# Patient Record
Sex: MALE | Race: WHITE | Employment: OTHER | URBAN - METROPOLITAN AREA
[De-identification: names, ages, dates, MRNs, and addresses within clinical notes are randomized per-mention and may not be internally consistent; named-entity substitution may affect disease eponyms.]

---

## 2017-06-07 ENCOUNTER — HOSPITAL ENCOUNTER (OUTPATIENT)
Dept: GENERAL RADIOLOGY | Age: 82
Discharge: HOME | End: 2017-06-07
Payer: MEDICARE

## 2017-06-07 DIAGNOSIS — J38.00 VOCAL CORD PARESIS: ICD-10-CM

## 2017-06-07 PROCEDURE — 71020 XR CHEST PA LAT: CPT

## 2018-06-19 ENCOUNTER — OFFICE VISIT (OUTPATIENT)
Dept: ENDOCRINOLOGY | Age: 83
End: 2018-06-19

## 2018-06-19 VITALS
DIASTOLIC BLOOD PRESSURE: 69 MMHG | BODY MASS INDEX: 21.77 KG/M2 | SYSTOLIC BLOOD PRESSURE: 163 MMHG | HEART RATE: 70 BPM | WEIGHT: 155.5 LBS | HEIGHT: 71 IN

## 2018-06-19 DIAGNOSIS — E03.9 HYPOTHYROIDISM, UNSPECIFIED TYPE: Primary | ICD-10-CM

## 2018-06-19 RX ORDER — ATORVASTATIN CALCIUM 40 MG/1
TABLET, FILM COATED ORAL
Refills: 0 | COMMUNITY
Start: 2018-05-21

## 2018-06-19 RX ORDER — AMLODIPINE BESYLATE 2.5 MG/1
TABLET ORAL
Refills: 0 | COMMUNITY
Start: 2018-04-12

## 2018-06-19 RX ORDER — AMMONIUM LACTATE 12 G/100G
LOTION TOPICAL
Refills: 0 | COMMUNITY
Start: 2018-06-12

## 2018-06-19 RX ORDER — POTASSIUM CHLORIDE 750 MG/1
TABLET, FILM COATED, EXTENDED RELEASE ORAL
Refills: 0 | COMMUNITY
Start: 2018-06-15

## 2018-06-19 RX ORDER — HYDROXYZINE 25 MG/1
TABLET, FILM COATED ORAL
Refills: 0 | COMMUNITY
Start: 2018-06-12

## 2018-06-19 RX ORDER — POLYETHYLENE GLYCOL 3350 17 G/17G
17 POWDER, FOR SOLUTION ORAL DAILY
COMMUNITY

## 2018-06-19 RX ORDER — NITROGLYCERIN 0.4 MG/1
0.4 TABLET SUBLINGUAL
COMMUNITY

## 2018-06-19 RX ORDER — AMIODARONE HYDROCHLORIDE 200 MG/1
TABLET ORAL
Refills: 0 | COMMUNITY
Start: 2018-05-14

## 2018-06-19 RX ORDER — LEVOTHYROXINE SODIUM 75 UG/1
TABLET ORAL
Refills: 0 | COMMUNITY
Start: 2018-06-13

## 2018-06-19 RX ORDER — CAMPHOR AND MENTHOL 5; 5 MG/ML; MG/ML
LOTION TOPICAL
Refills: 0 | COMMUNITY
Start: 2018-06-12

## 2018-06-19 RX ORDER — LEVOTHYROXINE SODIUM 50 UG/1
TABLET ORAL
Refills: 0 | COMMUNITY
Start: 2018-03-22

## 2018-06-19 NOTE — MR AVS SNAPSHOT
[address] [place] [address] [address] [facility] [address] [address] [phone] Patient: Ester Jean MRN: [id number] :1934 Visit Information Date & Time Provider Department Dept. Phone Encounter #  
 2018 11:10 AM Delma Gusman, [address] [place] [address] Diabetes and Endocrinology [phone] [id number] Your Appointments 10/19/2018 10:30 AM  
Follow Up with MD David Mallory Diabetes [facility]in[facility]: 4 month f/u   Thyroid [address] [facility] [address] [id number] [address] Upcoming Health Maintenance Date Due DTaP/Tdap/Td series (1 - Tdap) 10/25/1955 ZOSTER VACCINE AGE 60> 1994 GLAUCOMA SCREENING Q2Y 10/25/1999 Pneumococcal 65+ Low/Medium Risk (1 of 2 - PCV13) 10/25/1999 MEDICARE YEARLY EXAM 3/20/2018 Influenza Age 5 to Adult 2018 Allergies as of 2018  Review Complete On: 2018 By: Delma Gusman MD  
 No Known Allergies Current Immunizations  Never Reviewed No immunizations on file. Not reviewed this visit You Were Diagnosed With   
  
 Codes Comments Hypothyroidism, unspecified type    -  Primary ICD-10-CM: E03.9 ICD-9-CM: 366. 9 Vitals BP Pulse Height(growth percentile) Weight(growth percentile) BMI Smoking Status 163/69 (BP 1 Location: Right arm, BP Patient Position: Sitting) 70 5' 11\" (1.803 m) 155 lb 8 oz (70.5 kg) 21.69 kg/m2 Former Smoker Vitals History BMI and BSA Data Body Mass Index Body Surface Area  
 21.69 kg/m 2 1.88 m 2 Preferred Pharmacy Pharmacy Name Phone [facility] [phone] [place] [address] [phone] Your Updated Medication List  
  
   
This list is accurate as of 18 12:10 PM.  Always use your most recent med list.  
  
 amiodarone 200 mg tablet Commonly known as:  CORDARONE  
take 1 tablet by mouth once daily  
  
 amLODIPine 2.5 mg tablet Commonly known as:  [facility] Take two tabs daily  
  
 ammonium lactate 12 % lotion Commonly known as:  LAC-HYDRIN  
APPLY TOPICALLY AS NEEDED FOR DRY SKIN  
  
 atorvastatin 40 mg tablet Commonly known as:  LIPITOR  
  
 hydrOXYzine HCl 25 mg tablet Commonly known as:  ATARAX  
take 1 tablet by mouth at bedtime if needed for itching * levothyroxine 50 mcg tablet Commonly known as:  SYNTHROID * levothyroxine 75 mcg tablet Commonly known as:  SYNTHROID MIRALAX 17 gram/dose powder Generic drug:  polyethylene glycol Take 17 g by mouth daily. nitroglycerin 0.4 mg SL tablet Commonly known as:  NITROSTAT  
0.4 mg by SubLINGual route every five (5) minutes as needed for Chest Pain. Up to 3 doses. potassium chloride SR 10 mEq tablet Commonly known as:  KLOR-CON 10  
  
 SARNA ANTI-ITCH 0.5-0.5 % lotion Generic drug:  camphor-menthol APPLY TOPICALLY AS NEEDED FOR ITCHING  
  
 * Notice: This list has 2 medication(s) that are the same as other medications prescribed for you. Read the directions carefully, and ask your doctor or other care provider to review them with you. Patient Instructions PLEASE READ THESE INSTRUCTIONS:  
 
1. Plan to continue 50 mcg of levothyroxine each morning. 2. Remember to take levothyroxine with a glass of water on an empty stomach each day in the mornings, 1 hour prior to ingesting any food or other medications, including vitamins. 3. If you only need a slight dose reduction in the future, consider taking this dose only 6 days per week rather than 7 days per week. Make sure to follow up with Dr. Marlene Yuan, Call with Marnie oconnor. [address] [facility] Diabetes & Endocrinology 8 [facility]! Jerardo Sorto introduces Tagent patient portal. Now you can access parts of your medical record, email your doctor's office, and request medication refills online. 1. In your internet browser, go to [email or web address] 2. Click on the First Time User? Click Here link in the Sign In box. You will see the New Member Sign Up page. 3. Enter your Tagent Access Code exactly as it appears below. You will not need to use this code after youve completed the sign-up process. If you do not sign up before the expiration date, you must request a new code. · Tagent Access Code: [email or web address] Expires: 9/17/2018 12:10 PM 
 
4. Enter the last four digits of your Social Security Number (xxxx) and Date of Birth (mm/dd/yyyy) as indicated and click Submit. You will be taken to the next sign-up page. 5. Create a Tagent ID. This will be your Tagent login ID and cannot be changed, so think of one that is secure and easy to remember. 6. Create a Tagent password. You can change your password at any time. 7. Enter your Password Reset Question and Answer. This can be used at a later time if you forget your password. 8. Enter your e-mail address. You will receive e-mail notification when new information is available in [address] 9. Click Sign Up. You can now view and download portions of your medical record. 10. Click the Download Summary menu link to download a portable copy of your medical information. If you have questions, please visit the Frequently Asked Questions section of the Tagent website. Remember, Tagent is NOT to be used for urgent needs. For medical emergencies, dial 911. Now available from your iPhone and Android! Please provide this summary of care documentation to your next provider. Your primary care clinician is listed as Sergey Awad. If you have any questions after today's visit, please call [phone].

## 2018-06-19 NOTE — PROGRESS NOTES
CONSULTATION REQUESTED BY: Isabel Skinner MD     REASON FOR CONSULT: Hypothyroidism    CHIEF COMPLAINT: Evaluation for hypothyroidism    HISTORY OF PRESENT ILLNESS:   Almaz Bella is a 80 y.o. male with a PMHx as noted below who was referred to our endocrinology clinic for evaluation of hypothyroidism. Thyroid History:  Diagnosed with atrial fibrillation in Aug 2016, been on amiodarone since then. Patient was very weak, stumbling, and his thyroid level was checked in January. He was started on 50 mcg of levothyroxine in January of this year 2018. Family history is not significant for thryoid disease. Currently taking levothyroxine 50 mcg once daily  The patient admits to taking it 1 hour before breakfast on an empty stomach. Patient denies palpitations, had lost some weight. Review of outside labs:   7/27/18: TSH 2.59  1/22/18: .6  2/15/18: TSH 41.6  3/12/18: TSH 23.2  4/10/18: TSH 17.62  6/12/18: TSH 2.22, FT4 1.32    PAST MEDICAL/SURGICAL HISTORY:   Past Medical History:   Diagnosis Date    Angina pectoris (HCC)     Atrial fibrillation (HCC)     B12 deficiency     Bowel obstruction (HCC)     Brain bleed (HCC)     [facility] cancer ([facility] [address]     Hyperlipidemia     Hypertension     Kidney disease     Mitral valve prolapse      No past surgical history on file. ALLERGIES:   No Known Allergies    MEDICATIONS ON ADMISSION:     Current Outpatient Prescriptions:     amiodarone (CORDARONE) 200 mg tablet, take 1 tablet by mouth once daily, Disp: , Rfl: 0    amLODIPine (NORVASC) 2.5 mg tablet, Take two tabs daily, Disp: , Rfl: 0    potassium chloride SR (KLOR-CON 10) 10 mEq tablet, , Disp: , Rfl: 0    atorvastatin (LIPITOR) 40 mg tablet, , Disp: , Rfl: 0    levothyroxine (SYNTHROID) 75 mcg tablet, , Disp: , Rfl: 0    polyethylene glycol (MIRALAX) 17 gram/dose powder, Take 17 g by mouth daily. , Disp: , Rfl:     levothyroxine (SYNTHROID) 50 mcg tablet, , Disp: , Rfl: 0   SARNA ANTI-ITCH 0.5-0.5 % lotion, APPLY TOPICALLY AS NEEDED FOR ITCHING, Disp: , Rfl: 0    hydrOXYzine HCl (ATARAX) 25 mg tablet, take 1 tablet by mouth at bedtime if needed for itching, Disp: , Rfl: 0    ammonium lactate (LAC-HYDRIN) 12 % lotion, APPLY TOPICALLY AS NEEDED FOR DRY SKIN, Disp: , Rfl: 0    nitroglycerin (NITROSTAT) 0.4 mg SL tablet, 0.4 mg by SubLINGual route every five (5) minutes as needed for Chest Pain. Up to 3 doses. , Disp: , Rfl:     SOCIAL HISTORY:   Social History     Social History    Marital status:      Spouse name: N/A    Number of children: N/A    Years of education: N/A     Occupational History    Not on file. Social History Main Topics    Smoking status: Former Smoker    Smokeless tobacco: Never Used    Alcohol use No    Drug use: Not on file    Sexual activity: Not on file     Other Topics Concern    Not on file     Social History Narrative       FAMILY HISTORY:  Family History   Problem Relation Age of Onset    Alzheimer Mother     Coronary Artery Disease Father     Hypertension Brother     Heart Disease Brother        REVIEW OF SYSTEMS: Complete ROS assessed and noted for that which is described above, all else are negative.   Eyes: normal  ENT: normal  CVS: normal  Resp: normal  GI: normal  : normal  GYN: normal  Endocrine: normal  Integument: normal  Musculoskeletal: normal  Neuro: normal  Psych: normal      PHYSICAL EXAMINATION:    VITAL SIGNS:  Visit Vitals    /69 (BP 1 Location: Right arm, BP Patient Position: Sitting)    Pulse 70    Ht 5' 11\" (1.803 m)    Wt 155 lb 8 oz (70.5 kg)    BMI 21.69 kg/m2       GENERAL: NCAT, Sitting comfortably, NAD  EYES: EOMI, non-icteric, no proptosis  Ear/Nose/Throat: NCAT, no inflammation, no masses, thyroid gland is not appreciably enlarged  LYMPH NODES: No LAD  CARDIOVASCULAR: S1 S2, RRR, No murmur, 2+ radial pulses  RESPIRATORY: CTA b/l, no wheeze/rales  GASTROINTESTINAL: ND  MUSCULOSKELETAL: Normal ROM, no atrophy  SKIN: warm, no edema/rash/ or other skin changes  NEUROLOGIC: 5/5 power all extremities, no tremor, AAOx3  PSYCHIATRIC: Normal affect, Normal insight and judgement       REVIEW OF LABORATORY AND RADIOLOGY DATA:   Labs and documentation have been reviewed as described above. ASSESSMENT AND PLAN:   Romero Chacon is a 80 y.o. male with a PMHx as noted above who was referred to our endocrinology clinic for evaluation of hypothyroidism. Hypothyroidism    Today, we spent time discussing the physiologic mechanisms which govern thyroid hormone regulation and the normal responses to abnormal thyroid function. We also discussed their current condition in the setting of this physiologic response. 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 We discussed this thoroughly and noted the likelihood that he would need thyroid hormone replacement in the long term. He is taking it properly and feeling much better. Discussed the symptoms of hyperthyroidism and its effect on the heart considering his atrial fibrillation, and the risk of recurrent falls if his thyroid levels get to low or too high. PLAN:  Recent levels normal as discussed, continue 50 mcg once daily. Due to atrial fibrillation, goal TSH no less than 1.0 (1 - 5 ok). If only a slight dose reduction is needed in the future, can reduce same dose of 50 mcg to 6 days per week, rather than 7 days per week. Patient advised to take levothyroxine with a glass of water on an empty stomach each day in the mornings, 1 hour prior to ingesting any food or other medications, including vitamins. Patient and his wife were given follow up options, either to return to our clinic or to resume thyroid follow up with Dr. Ana Paula Cooper, and they have noted they would like to continue routine thyroid follow up with Dr. Ana Paula Cooper. They are welcome to call with concerns. Milan Jaciel.  [address] [facility] End[facility]

## 2018-06-19 NOTE — PATIENT INSTRUCTIONS
PLEASE READ THESE INSTRUCTIONS:     1. Plan to continue 50 mcg of levothyroxine each morning. 2. Remember to take levothyroxine with a glass of water on an empty stomach each day in the mornings, 1 hour prior to ingesting any food or other medications, including vitamins. 3. If you only need a slight dose reduction in the future, consider taking this dose only 6 days per week rather than 7 days per week. Make sure to follow up with Dr. Deneen Pantoja,    Call with concerns,    Jhoan Stahl.  [address] [facility]  [address] [place] [address]